# Patient Record
Sex: FEMALE | Race: WHITE | NOT HISPANIC OR LATINO | Employment: FULL TIME | URBAN - METROPOLITAN AREA
[De-identification: names, ages, dates, MRNs, and addresses within clinical notes are randomized per-mention and may not be internally consistent; named-entity substitution may affect disease eponyms.]

---

## 2018-06-18 ENCOUNTER — TRANSCRIBE ORDERS (OUTPATIENT)
Dept: ADMINISTRATIVE | Facility: HOSPITAL | Age: 33
End: 2018-06-18

## 2018-06-18 ENCOUNTER — HOSPITAL ENCOUNTER (OUTPATIENT)
Dept: CARDIOLOGY | Facility: HOSPITAL | Age: 33
Discharge: HOME | End: 2018-06-18
Attending: EMERGENCY MEDICINE

## 2018-06-18 ENCOUNTER — HOSPITAL ENCOUNTER (EMERGENCY)
Facility: HOSPITAL | Age: 33
Discharge: HOME | End: 2018-06-18
Attending: EMERGENCY MEDICINE | Admitting: EMERGENCY MEDICINE

## 2018-06-18 ENCOUNTER — APPOINTMENT (OUTPATIENT)
Dept: GENERAL RADIOLOGY | Facility: HOSPITAL | Age: 33
End: 2018-06-18

## 2018-06-18 ENCOUNTER — APPOINTMENT (OUTPATIENT)
Dept: CT IMAGING | Facility: HOSPITAL | Age: 33
End: 2018-06-18

## 2018-06-18 VITALS
HEIGHT: 61 IN | DIASTOLIC BLOOD PRESSURE: 78 MMHG | BODY MASS INDEX: 26.43 KG/M2 | SYSTOLIC BLOOD PRESSURE: 121 MMHG | WEIGHT: 140 LBS | TEMPERATURE: 98.1 F | RESPIRATION RATE: 16 BRPM | HEART RATE: 98 BPM | OXYGEN SATURATION: 99 %

## 2018-06-18 DIAGNOSIS — R55 NEAR SYNCOPE: Primary | ICD-10-CM

## 2018-06-18 DIAGNOSIS — R00.2 PALPITATIONS: ICD-10-CM

## 2018-06-18 DIAGNOSIS — R00.2 PALPITATION: Primary | ICD-10-CM

## 2018-06-18 DIAGNOSIS — R00.2 PALPITATION: ICD-10-CM

## 2018-06-18 LAB
ALBUMIN SERPL-MCNC: 4.4 G/DL (ref 3.5–5.2)
ALBUMIN/GLOB SERPL: 1.8 G/DL
ALP SERPL-CCNC: 52 U/L (ref 40–129)
ALT SERPL W P-5'-P-CCNC: 33 U/L (ref 5–33)
AMPHET+METHAMPHET UR QL: POSITIVE
AMPHETAMINES UR QL: NEGATIVE
ANION GAP SERPL CALCULATED.3IONS-SCNC: 11.5 MMOL/L
AST SERPL-CCNC: 26 U/L (ref 5–32)
B-HCG UR QL: NEGATIVE
BACTERIA UR QL AUTO: ABNORMAL /HPF
BARBITURATES UR QL SCN: POSITIVE
BASOPHILS # BLD AUTO: 0.03 10*3/MM3 (ref 0–0.2)
BASOPHILS NFR BLD AUTO: 0.4 % (ref 0–2)
BENZODIAZ UR QL SCN: POSITIVE
BILIRUB SERPL-MCNC: 0.2 MG/DL (ref 0.2–1.2)
BILIRUB UR QL STRIP: NEGATIVE
BUN BLD-MCNC: 11 MG/DL (ref 6–20)
BUN/CREAT SERPL: 17.7 (ref 7–25)
BUPRENORPHINE SERPL-MCNC: NEGATIVE NG/ML
CALCIUM SPEC-SCNC: 9.2 MG/DL (ref 8.6–10.5)
CANNABINOIDS SERPL QL: NEGATIVE
CHLORIDE SERPL-SCNC: 104 MMOL/L (ref 98–107)
CLARITY UR: CLEAR
CO2 SERPL-SCNC: 24.5 MMOL/L (ref 22–29)
COCAINE UR QL: NEGATIVE
COLOR UR: YELLOW
CREAT BLD-MCNC: 0.62 MG/DL (ref 0.57–1)
DEPRECATED RDW RBC AUTO: 42.7 FL (ref 37–54)
EOSINOPHIL # BLD AUTO: 0.28 10*3/MM3 (ref 0.1–0.3)
EOSINOPHIL NFR BLD AUTO: 3.6 % (ref 0–4)
ERYTHROCYTE [DISTWIDTH] IN BLOOD BY AUTOMATED COUNT: 12.3 % (ref 11.5–14.5)
GFR SERPL CREATININE-BSD FRML MDRD: 110 ML/MIN/1.73
GLOBULIN UR ELPH-MCNC: 2.4 GM/DL
GLUCOSE BLD-MCNC: 102 MG/DL (ref 65–99)
GLUCOSE UR STRIP-MCNC: NEGATIVE MG/DL
HCT VFR BLD AUTO: 40.5 % (ref 37–47)
HGB BLD-MCNC: 13.7 G/DL (ref 12–16)
HGB UR QL STRIP.AUTO: NEGATIVE
HYALINE CASTS UR QL AUTO: ABNORMAL /LPF
IMM GRANULOCYTES # BLD: 0.02 10*3/MM3 (ref 0–0.03)
IMM GRANULOCYTES NFR BLD: 0.3 % (ref 0–0.5)
KETONES UR QL STRIP: NEGATIVE
LEUKOCYTE ESTERASE UR QL STRIP.AUTO: NEGATIVE
LYMPHOCYTES # BLD AUTO: 2.66 10*3/MM3 (ref 0.6–4.8)
LYMPHOCYTES NFR BLD AUTO: 34 % (ref 20–45)
MCH RBC QN AUTO: 32 PG (ref 27–31)
MCHC RBC AUTO-ENTMCNC: 33.8 G/DL (ref 31–37)
MCV RBC AUTO: 94.6 FL (ref 81–99)
METHADONE UR QL SCN: NEGATIVE
MONOCYTES # BLD AUTO: 0.42 10*3/MM3 (ref 0–1)
MONOCYTES NFR BLD AUTO: 5.4 % (ref 3–8)
NEUTROPHILS # BLD AUTO: 4.41 10*3/MM3 (ref 1.5–8.3)
NEUTROPHILS NFR BLD AUTO: 56.3 % (ref 45–70)
NITRITE UR QL STRIP: NEGATIVE
NRBC BLD MANUAL-RTO: 0 /100 WBC (ref 0–0)
OPIATES UR QL: NEGATIVE
OXYCODONE UR QL SCN: NEGATIVE
PCP UR QL SCN: NEGATIVE
PH UR STRIP.AUTO: 8.5 [PH] (ref 4.5–8)
PLATELET # BLD AUTO: 202 10*3/MM3 (ref 140–500)
PMV BLD AUTO: 11.3 FL (ref 7.4–10.4)
POTASSIUM BLD-SCNC: 3.5 MMOL/L (ref 3.5–5.2)
PROPOXYPH UR QL: NEGATIVE
PROT SERPL-MCNC: 6.8 G/DL (ref 6–8.5)
PROT UR QL STRIP: ABNORMAL
RBC # BLD AUTO: 4.28 10*6/MM3 (ref 4.2–5.4)
RBC # UR: ABNORMAL /HPF
REF LAB TEST METHOD: ABNORMAL
SODIUM BLD-SCNC: 140 MMOL/L (ref 136–145)
SP GR UR STRIP: 1.01 (ref 1–1.03)
SQUAMOUS #/AREA URNS HPF: ABNORMAL /HPF
TRICYCLICS UR QL SCN: NEGATIVE
TROPONIN T SERPL-MCNC: <0.01 NG/ML (ref 0–0.03)
TSH SERPL DL<=0.05 MIU/L-ACNC: 1.44 MIU/ML (ref 0.27–4.2)
UROBILINOGEN UR QL STRIP: ABNORMAL
WBC NRBC COR # BLD: 7.82 10*3/MM3 (ref 4.8–10.8)
WBC UR QL AUTO: ABNORMAL /HPF

## 2018-06-18 PROCEDURE — 71045 X-RAY EXAM CHEST 1 VIEW: CPT

## 2018-06-18 PROCEDURE — 81001 URINALYSIS AUTO W/SCOPE: CPT | Performed by: EMERGENCY MEDICINE

## 2018-06-18 PROCEDURE — 81025 URINE PREGNANCY TEST: CPT | Performed by: EMERGENCY MEDICINE

## 2018-06-18 PROCEDURE — P9612 CATHETERIZE FOR URINE SPEC: HCPCS

## 2018-06-18 PROCEDURE — 93225 XTRNL ECG REC<48 HRS REC: CPT

## 2018-06-18 PROCEDURE — 80053 COMPREHEN METABOLIC PANEL: CPT | Performed by: EMERGENCY MEDICINE

## 2018-06-18 PROCEDURE — 99283 EMERGENCY DEPT VISIT LOW MDM: CPT

## 2018-06-18 PROCEDURE — 84484 ASSAY OF TROPONIN QUANT: CPT | Performed by: EMERGENCY MEDICINE

## 2018-06-18 PROCEDURE — 70450 CT HEAD/BRAIN W/O DYE: CPT

## 2018-06-18 PROCEDURE — 84443 ASSAY THYROID STIM HORMONE: CPT | Performed by: EMERGENCY MEDICINE

## 2018-06-18 PROCEDURE — 93005 ELECTROCARDIOGRAM TRACING: CPT | Performed by: EMERGENCY MEDICINE

## 2018-06-18 PROCEDURE — 85025 COMPLETE CBC W/AUTO DIFF WBC: CPT | Performed by: EMERGENCY MEDICINE

## 2018-06-18 PROCEDURE — 93010 ELECTROCARDIOGRAM REPORT: CPT | Performed by: INTERNAL MEDICINE

## 2018-06-18 PROCEDURE — 80306 DRUG TEST PRSMV INSTRMNT: CPT | Performed by: EMERGENCY MEDICINE

## 2018-06-18 PROCEDURE — 99284 EMERGENCY DEPT VISIT MOD MDM: CPT | Performed by: EMERGENCY MEDICINE

## 2018-06-18 RX ORDER — ALPRAZOLAM 2 MG/1
2 TABLET ORAL DAILY PRN
COMMUNITY
End: 2018-09-21

## 2018-06-18 NOTE — ED PROVIDER NOTES
Subjective   History of Present Illness  History of Present Illness    Chief complaint: Patient felt like she was going to pass out    Location: At [facility] working    Quality/Severity:  Near syncope    Timing/Onset/Duration: Acute onset    Modifying Factors: Time seems to make it better, nothing makes it worse.  Patient relates that her symptoms started when she started the methotrexate.    Associated Symptoms: She complains of a mild headache.  No fever chills or cough.  No sore throat earache or nasal congestion.  No chest pain.  She did have palpitations.  She states she fell at her heart was racing.  No abdominal pain.  No diarrhea or burning when she urinates.  No nausea or vomiting.    Narrative: As 33-year-old white female presents after having a near syncopal episode approximately 1 hour prior to arrival.  Patient has a history of cervical cancer.  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  The patient states that she felt like her heart was racing and that she was about ready to pass out.  The patient complains of a mild headache.  No fever chills or cough.  No sore throat earache or nasal congestion.  His pain or shortness of breath.  No abdominal pain.  No diarrhea or burning when she urinates.  No change in the size, color, or consistency of her stools.  The patient relates that she has had these symptoms on and off since she started her methotrexate.    PCP:  No Known Provider      Review of Systems   Constitutional: Negative for chills and fever.   HENT: Negative for ear pain and sore throat.    Eyes: Negative for discharge, redness and visual disturbance.   Respiratory: Negative for cough, chest tightness, shortness of breath and wheezing.    Cardiovascular: Positive for palpitations. Negative for chest pain and leg swelling.   Gastrointestinal: Negative for abdominal pain, blood in stool, constipation, diarrhea, nausea and vomiting.   Endocrine: Negative for polyuria.    Genitourinary: Negative for difficulty urinating.   Musculoskeletal: Negative for back pain.   Skin: Negative for pallor and rash.   Neurological: Positive for light-headedness. Negative for dizziness, speech difficulty, weakness, numbness and headaches.   Hematological: Negative for adenopathy.   Psychiatric/Behavioral: Negative.  Negative for confusion.        Medication List      ASK your doctor about these medications    ALPRAZolam 2 MG tablet  Commonly known as:  XANAX     methotrexate 2.5 MG tablet     VYVANSE 70 MG capsule  Generic drug:  lisdexamfetamine          Past Medical History:   Diagnosis Date   • Cancer    • Cervical cancer        Allergies   Allergen Reactions   • Penicillins Hives       Past Surgical History:   Procedure Laterality Date   • CERVIX REMOVAL     •  SECTION         History reviewed. No pertinent family history.    Social History     Social History   • Marital status:      Social History Main Topics   • Smoking status: Never Smoker   • Alcohol use No   • Drug use: No     Other Topics Concern   • Not on file           Objective   Physical Exam   Constitutional: She is oriented to person, place, and time. She appears well-developed and well-nourished. No distress.   ED Triage Vitals (18 1806)  Temp: 98.1 °F (36.7 °C)  Heart Rate: 81  Resp: 16  BP: 112/76  SpO2: 100 %  Temp src: Oral  Heart Rate Source: Monitor  Patient Position: n/a  BP Location: n/a  FiO2 (%): n/a    The patient's vitals were reviewed by me.  Unless otherwise noted they are within normal limits.     HENT:   Head: Normocephalic and atraumatic.   Right Ear: External ear normal.   Left Ear: External ear normal.   Nose: Nose normal.   Mouth/Throat: Oropharynx is clear and moist.   Eyes: Conjunctivae and EOM are normal. Pupils are equal, round, and reactive to light. Right eye exhibits no discharge. Left eye exhibits no discharge. No scleral icterus.   Neck: Normal range of motion. Neck supple. No JVD  present. No tracheal deviation present. No thyromegaly present.   Cardiovascular: Normal rate, regular rhythm, normal heart sounds and intact distal pulses.  Exam reveals no gallop and no friction rub.    No murmur heard.  Pulmonary/Chest: Effort normal and breath sounds normal. No stridor. No respiratory distress. She has no wheezes. She has no rales. She exhibits no tenderness.   Abdominal: Soft. Bowel sounds are normal. She exhibits no distension and no mass. There is no tenderness. There is no rebound and no guarding. No hernia.   Musculoskeletal: Normal range of motion. She exhibits no edema or deformity.   Lymphadenopathy:     She has no cervical adenopathy.   Neurological: She is alert and oriented to person, place, and time. No cranial nerve deficit or sensory deficit. She exhibits normal muscle tone. Coordination normal.   Skin: Skin is warm and dry. No rash noted. She is not diaphoretic. No erythema. No pallor.   Psychiatric: Her behavior is normal.   Nursing note and vitals reviewed.      Procedures           ED Course  ED Course as of Jun 18 1947   Mon Jun 18, 2018 1945 The laboratory values were reviewed by me.  The urine drug screen is positive for amphetamines, benzodiazepines, and barbiturates.  The urine microscopic shows 0-2 RBCs.  The serum glucose is 102.  The laboratory values are otherwise unremarkable.  [RC]      ED Course User Index  [RC] Sergey Cuello MD      6:42 PM, 06/18/18:  The EKG was obtained at 1837.  EKG was read by me at 1838.  EKG shows a normal sinus rhythm with rate of 76.  There is a normal axis with no hypertrophy.  The CT, QRS, QT intervals are unremarkable.  There is no ectopy.  There is no acute ST elevation or depression.  7:17 PM, 06/18/18:  The chest x-ray was contemporaneously reviewed by me.  There is no active disease.  7:18 PM, 06/18/18:  The orthostatics were negative.      7:44 PM, 06/18/18:  Patient also takes BuPap for her migraine headaches which will give  her a positive barbiturate screen on the urine drug screen.  The patient is on binasal which is an amphetamine and xanax which will cause her to screen positive for benzodiazepines.    7:46 PM, 06/18/18:  Patient was reassessed.  She has no new complaints.  Her vital signs were reviewed and are stable.  No further episodes of heart racing.    7:46 PM, 06/18/18:  Patient's diagnosis of near-syncope was discussed with her.  She should rest.  She should drink lots of fluids.  We will obtain a Holter monitor and have the patient follow with Dr. Hurtado.  She should decrease her caffeine.  All the patient's questions were achieved she will be discharged in good condition.    8:29 PM, 06/18/18:  CT of the head as read by Dr. Haque negative.    MDM    No orders to display     Labs Reviewed - No data to display  No results found.    Final diagnoses:   None         ED Medications:  Medications - No data to display    New Medications:     Medication List      ASK your doctor about these medications    ALPRAZolam 2 MG tablet  Commonly known as:  XANAX     methotrexate 2.5 MG tablet     VYVANSE 70 MG capsule  Generic drug:  lisdexamfetamine          Stopped Medications:     Medication List      ASK your doctor about these medications    ALPRAZolam 2 MG tablet  Commonly known as:  XANAX     methotrexate 2.5 MG tablet     VYVANSE 70 MG capsule  Generic drug:  lisdexamfetamine            Final diagnoses:   Near syncope   Palpitations            Sergey Cuello MD  06/18/18 8497

## 2018-06-18 NOTE — DISCHARGE INSTRUCTIONS
Rest.  Drink lots of fluids.  Outpatient Holter monitor with the results to Dr. Hurtado.  Follow-up with Dr. Hurtado after the Holter monitor.  Return to emergency department if there is increasing episodes of fast heart rate, passing out or near passing out, worse in any way at all.  Decrease your caffeine intake.

## 2019-03-07 ENCOUNTER — HOSPITAL ENCOUNTER (OUTPATIENT)
Dept: GENERAL RADIOLOGY | Facility: HOSPITAL | Age: 34
Discharge: HOME | End: 2019-03-07

## 2019-03-07 ENCOUNTER — TRANSCRIBE ORDERS (OUTPATIENT)
Dept: ADMINISTRATIVE | Facility: HOSPITAL | Age: 34
End: 2019-03-07

## 2019-03-07 DIAGNOSIS — M79.672 LEFT FOOT PAIN: Primary | ICD-10-CM

## 2019-03-07 DIAGNOSIS — M79.672 LEFT FOOT PAIN: ICD-10-CM

## 2019-03-07 PROCEDURE — 73630 X-RAY EXAM OF FOOT: CPT

## 2019-04-04 DIAGNOSIS — S93.622D: Primary | ICD-10-CM

## 2019-04-16 DIAGNOSIS — S93.622D: Primary | ICD-10-CM

## 2019-09-03 ENCOUNTER — HOSPITAL ENCOUNTER (EMERGENCY)
Facility: HOSPITAL | Age: 34
Discharge: HOME | End: 2019-09-03
Attending: EMERGENCY MEDICINE | Admitting: EMERGENCY MEDICINE

## 2019-09-03 ENCOUNTER — APPOINTMENT (OUTPATIENT)
Dept: CT IMAGING | Facility: HOSPITAL | Age: 34
End: 2019-09-03

## 2019-09-03 VITALS
RESPIRATION RATE: 16 BRPM | TEMPERATURE: 98.1 F | DIASTOLIC BLOOD PRESSURE: 70 MMHG | SYSTOLIC BLOOD PRESSURE: 109 MMHG | HEIGHT: 61 IN | BODY MASS INDEX: 31.42 KG/M2 | HEART RATE: 62 BPM | WEIGHT: 166.4 LBS | OXYGEN SATURATION: 97 %

## 2019-09-03 DIAGNOSIS — G43.109 MIGRAINE WITH AURA AND WITHOUT STATUS MIGRAINOSUS, NOT INTRACTABLE: Primary | ICD-10-CM

## 2019-09-03 LAB
ALBUMIN SERPL-MCNC: 4.8 G/DL (ref 3.5–5.2)
ALBUMIN/GLOB SERPL: 1.7 G/DL
ALP SERPL-CCNC: 60 U/L (ref 39–117)
ALT SERPL W P-5'-P-CCNC: 102 U/L (ref 1–33)
ANION GAP SERPL CALCULATED.3IONS-SCNC: 12.1 MMOL/L (ref 5–15)
AST SERPL-CCNC: 31 U/L (ref 1–32)
B-HCG UR QL: NEGATIVE
BACTERIA UR QL AUTO: ABNORMAL /HPF
BASOPHILS # BLD AUTO: 0.04 10*3/MM3 (ref 0–0.2)
BASOPHILS NFR BLD AUTO: 0.5 % (ref 0–1.5)
BILIRUB SERPL-MCNC: 0.6 MG/DL (ref 0.2–1.2)
BILIRUB UR QL STRIP: NEGATIVE
BUN BLD-MCNC: 10 MG/DL (ref 6–20)
BUN/CREAT SERPL: 13.9 (ref 7–25)
CALCIUM SPEC-SCNC: 9.4 MG/DL (ref 8.6–10.5)
CHLORIDE SERPL-SCNC: 108 MMOL/L (ref 98–107)
CLARITY UR: CLEAR
CO2 SERPL-SCNC: 24.9 MMOL/L (ref 22–29)
COLOR UR: YELLOW
CREAT BLD-MCNC: 0.72 MG/DL (ref 0.57–1)
DEPRECATED RDW RBC AUTO: 47.8 FL (ref 37–54)
EOSINOPHIL # BLD AUTO: 0.18 10*3/MM3 (ref 0–0.4)
EOSINOPHIL NFR BLD AUTO: 2.2 % (ref 0.3–6.2)
ERYTHROCYTE [DISTWIDTH] IN BLOOD BY AUTOMATED COUNT: 13.3 % (ref 12.3–15.4)
GFR SERPL CREATININE-BSD FRML MDRD: 92 ML/MIN/1.73
GLOBULIN UR ELPH-MCNC: 2.8 GM/DL
GLUCOSE BLD-MCNC: 116 MG/DL (ref 65–99)
GLUCOSE UR STRIP-MCNC: NEGATIVE MG/DL
HCT VFR BLD AUTO: 41 % (ref 34–46.6)
HGB BLD-MCNC: 13.1 G/DL (ref 12–15.9)
HGB UR QL STRIP.AUTO: ABNORMAL
HYALINE CASTS UR QL AUTO: ABNORMAL /LPF
IMM GRANULOCYTES # BLD AUTO: 0.03 10*3/MM3 (ref 0–0.05)
IMM GRANULOCYTES NFR BLD AUTO: 0.4 % (ref 0–0.5)
KETONES UR QL STRIP: ABNORMAL
LEUKOCYTE ESTERASE UR QL STRIP.AUTO: NEGATIVE
LYMPHOCYTES # BLD AUTO: 1.95 10*3/MM3 (ref 0.7–3.1)
LYMPHOCYTES NFR BLD AUTO: 24.3 % (ref 19.6–45.3)
MCH RBC QN AUTO: 30.9 PG (ref 26.6–33)
MCHC RBC AUTO-ENTMCNC: 32 G/DL (ref 31.5–35.7)
MCV RBC AUTO: 96.7 FL (ref 79–97)
MONOCYTES # BLD AUTO: 0.45 10*3/MM3 (ref 0.1–0.9)
MONOCYTES NFR BLD AUTO: 5.6 % (ref 5–12)
MUCOUS THREADS URNS QL MICRO: ABNORMAL /HPF
NEUTROPHILS # BLD AUTO: 5.38 10*3/MM3 (ref 1.7–7)
NEUTROPHILS NFR BLD AUTO: 67 % (ref 42.7–76)
NITRITE UR QL STRIP: NEGATIVE
NRBC BLD AUTO-RTO: 0 /100 WBC (ref 0–0.2)
PH UR STRIP.AUTO: 5.5 [PH] (ref 5–8)
PLATELET # BLD AUTO: 216 10*3/MM3 (ref 140–450)
PMV BLD AUTO: 11.4 FL (ref 6–12)
POTASSIUM BLD-SCNC: 4 MMOL/L (ref 3.5–5.2)
PROT SERPL-MCNC: 7.6 G/DL (ref 6–8.5)
PROT UR QL STRIP: NEGATIVE
RBC # BLD AUTO: 4.24 10*6/MM3 (ref 3.77–5.28)
RBC # UR: ABNORMAL /HPF
REF LAB TEST METHOD: ABNORMAL
SODIUM BLD-SCNC: 145 MMOL/L (ref 136–145)
SP GR UR STRIP: 1.02 (ref 1–1.03)
SQUAMOUS #/AREA URNS HPF: ABNORMAL /HPF
UROBILINOGEN UR QL STRIP: ABNORMAL
WBC NRBC COR # BLD: 8.03 10*3/MM3 (ref 3.4–10.8)
WBC UR QL AUTO: ABNORMAL /HPF

## 2019-09-03 PROCEDURE — 85025 COMPLETE CBC W/AUTO DIFF WBC: CPT | Performed by: NURSE PRACTITIONER

## 2019-09-03 PROCEDURE — 25010000002 DEXAMETHASONE SODIUM PHOSPHATE 20 MG/5ML SOLUTION: Performed by: NURSE PRACTITIONER

## 2019-09-03 PROCEDURE — 81025 URINE PREGNANCY TEST: CPT | Performed by: NURSE PRACTITIONER

## 2019-09-03 PROCEDURE — 96374 THER/PROPH/DIAG INJ IV PUSH: CPT

## 2019-09-03 PROCEDURE — 99283 EMERGENCY DEPT VISIT LOW MDM: CPT

## 2019-09-03 PROCEDURE — 81001 URINALYSIS AUTO W/SCOPE: CPT | Performed by: NURSE PRACTITIONER

## 2019-09-03 PROCEDURE — 25010000002 DIPHENHYDRAMINE PER 50 MG: Performed by: NURSE PRACTITIONER

## 2019-09-03 PROCEDURE — 25010000002 KETOROLAC TROMETHAMINE PER 15 MG: Performed by: NURSE PRACTITIONER

## 2019-09-03 PROCEDURE — 25010000002 PROCHLORPERAZINE EDISYLATE PER 10 MG: Performed by: NURSE PRACTITIONER

## 2019-09-03 PROCEDURE — 96375 TX/PRO/DX INJ NEW DRUG ADDON: CPT

## 2019-09-03 PROCEDURE — 70450 CT HEAD/BRAIN W/O DYE: CPT

## 2019-09-03 PROCEDURE — 80053 COMPREHEN METABOLIC PANEL: CPT | Performed by: NURSE PRACTITIONER

## 2019-09-03 RX ORDER — KETOROLAC TROMETHAMINE 15 MG/ML
15 INJECTION, SOLUTION INTRAMUSCULAR; INTRAVENOUS ONCE
Status: COMPLETED | OUTPATIENT
Start: 2019-09-03 | End: 2019-09-03

## 2019-09-03 RX ORDER — DIPHENHYDRAMINE HYDROCHLORIDE 50 MG/ML
25 INJECTION INTRAMUSCULAR; INTRAVENOUS ONCE
Status: COMPLETED | OUTPATIENT
Start: 2019-09-03 | End: 2019-09-03

## 2019-09-03 RX ORDER — DEXAMETHASONE SODIUM PHOSPHATE 4 MG/ML
8 INJECTION, SOLUTION INTRA-ARTICULAR; INTRALESIONAL; INTRAMUSCULAR; INTRAVENOUS; SOFT TISSUE ONCE
Status: COMPLETED | OUTPATIENT
Start: 2019-09-03 | End: 2019-09-03

## 2019-09-03 RX ORDER — SODIUM CHLORIDE 0.9 % (FLUSH) 0.9 %
10 SYRINGE (ML) INJECTION AS NEEDED
Status: DISCONTINUED | OUTPATIENT
Start: 2019-09-03 | End: 2019-09-03 | Stop reason: HOSPADM

## 2019-09-03 RX ORDER — PROCHLORPERAZINE EDISYLATE 5 MG/ML
5 INJECTION INTRAMUSCULAR; INTRAVENOUS ONCE
Status: COMPLETED | OUTPATIENT
Start: 2019-09-03 | End: 2019-09-03

## 2019-09-03 RX ADMIN — KETOROLAC TROMETHAMINE 15 MG: 15 INJECTION, SOLUTION INTRAMUSCULAR; INTRAVENOUS at 16:42

## 2019-09-03 RX ADMIN — DIPHENHYDRAMINE HYDROCHLORIDE 25 MG: 50 INJECTION, SOLUTION INTRAMUSCULAR; INTRAVENOUS at 16:39

## 2019-09-03 RX ADMIN — SODIUM CHLORIDE 1000 ML: 9 INJECTION, SOLUTION INTRAVENOUS at 16:14

## 2019-09-03 RX ADMIN — DEXAMETHASONE SODIUM PHOSPHATE 8 MG: 4 INJECTION, SOLUTION INTRAMUSCULAR; INTRAVENOUS at 16:44

## 2019-09-03 RX ADMIN — PROCHLORPERAZINE EDISYLATE 5 MG: 5 INJECTION INTRAMUSCULAR; INTRAVENOUS at 16:46

## 2019-09-03 NOTE — ED NOTES
Pt reports she has had a migraine for 24 hours. Pt reports her home migraine medication are not helping.     Tyra Hansen, RN  09/03/19 0412

## 2019-09-03 NOTE — ED PROVIDER NOTES
" EMERGENCY DEPARTMENT ENCOUNTER    Room Number:    Date of encounter:  9/3/2019  PCP: Yesi Ann MD  Historian: Patient, family member      HPI:  Chief Complaint: HA  A complete HPI/ROS/PMH/PSH/SH/FH are unobtainable due to: nothing    Context: Tyra Carpenter is a 34 y.o. female with lengthy history of migraine headaches treated by her PCP who presents to the ED c/o worsening HA frequency that started 2 months ago. She notes that she is used to having HA's but the past 2 moths they have progressed. She states that the sx have greatly increased causing nosebleeds and sometimes \"blackouts\". She notes that she gets HA quite frequently with migraines being 3x a week but she keeps a daily headache that is manageable. She reports that she has also been on Fioricet as prescribed and has been taking Topamax PRN. Her mother has tried to tell her that Topamax is a daily drug but she has not followed this advice.   She notes that she has been using heating pads, cool packs, and taking Ibuprofen PRN with minimal relief.  She states she keeps a log of her headaches.  I asked her why she has not seen a specialist and she states \"I just have not wanted to deal with it\".      MEDICAL RECORDS REVIEWED  Reviewed pt's most recent UC visits from 19 and 19 for HA both times she received IM Toradol and was discharged.         PAST MEDICAL HISTORY  Active Ambulatory Problems     Diagnosis Date Noted   • No Active Ambulatory Problems     Resolved Ambulatory Problems     Diagnosis Date Noted   • No Resolved Ambulatory Problems     Past Medical History:   Diagnosis Date   • Cancer (CMS/HCC)    • Cervical cancer (CMS/HCC)    • Migraine          PAST SURGICAL HISTORY  Past Surgical History:   Procedure Laterality Date   • CERVIX REMOVAL     •  SECTION           FAMILY HISTORY  Family History   Problem Relation Age of Onset   • No Known Problems Mother    • No Known Problems Father          SOCIAL HISTORY  Social " History     Socioeconomic History   • Marital status:      Spouse name: Not on file   • Number of children: Not on file   • Years of education: Not on file   • Highest education level: Not on file   Tobacco Use   • Smoking status: Never Smoker   • Smokeless tobacco: Never Used   Substance and Sexual Activity   • Alcohol use: No   • Drug use: No   • Sexual activity: Defer         ALLERGIES  Latex; Penicillins; and Zofran [ondansetron hcl]        REVIEW OF SYSTEMS  Review of Systems   Constitutional: Negative for activity change, appetite change, fatigue and fever.   HENT: Positive for nosebleeds. Negative for congestion, dental problem, ear pain, facial swelling, rhinorrhea, sinus pressure and sinus pain.    Eyes: Positive for photophobia.   Respiratory: Negative for chest tightness.    Gastrointestinal: Positive for nausea. Negative for abdominal pain and vomiting.   Musculoskeletal: Negative for neck pain and neck stiffness.   Neurological: Positive for headaches. Negative for syncope, speech difficulty, light-headedness and numbness.        All systems reviewed and negative except for those discussed in HPI.       PHYSICAL EXAM    I have reviewed the triage vital signs and nursing notes.    ED Triage Vitals [09/03/19 1152]   Temp Heart Rate Resp BP SpO2   98.1 °F (36.7 °C) 74 18 -- 98 %      Temp src Heart Rate Source Patient Position BP Location FiO2 (%)   Tympanic -- -- -- --       Physical Exam   GENERAL: NAD, speech is pressured  HENT: nares patent, right nare has what appears to be a nasal polyp inside, no evidence of recent nosebleed.  Posterior oropharynx without erythema or evidence of recent epistaxis  EYES: no scleral icterus, PERRL, pupils appear enlarged but briskly reactive and EOM normal  CV: regular rhythm, regular rate  RESPIRATORY: normal effort  ABDOMEN: soft  MUSCULOSKELETAL: no deformity, neck supple, negative kernig and negative brudinski sign  NEURO: alert, moves all extremities,  follows commands, no focal neurological deficits  SKIN: warm, dry        LAB RESULTS  Recent Results (from the past 24 hour(s))   Comprehensive Metabolic Panel    Collection Time: 09/03/19  4:13 PM   Result Value Ref Range    Glucose 116 (H) 65 - 99 mg/dL    BUN 10 6 - 20 mg/dL    Creatinine 0.72 0.57 - 1.00 mg/dL    Sodium 145 136 - 145 mmol/L    Potassium 4.0 3.5 - 5.2 mmol/L    Chloride 108 (H) 98 - 107 mmol/L    CO2 24.9 22.0 - 29.0 mmol/L    Calcium 9.4 8.6 - 10.5 mg/dL    Total Protein 7.6 6.0 - 8.5 g/dL    Albumin 4.80 3.50 - 5.20 g/dL    ALT (SGPT) 102 (H) 1 - 33 U/L    AST (SGOT) 31 1 - 32 U/L    Alkaline Phosphatase 60 39 - 117 U/L    Total Bilirubin 0.6 0.2 - 1.2 mg/dL    eGFR Non African Amer 92 >60 mL/min/1.73    Globulin 2.8 gm/dL    A/G Ratio 1.7 g/dL    BUN/Creatinine Ratio 13.9 7.0 - 25.0    Anion Gap 12.1 5.0 - 15.0 mmol/L   CBC Auto Differential    Collection Time: 09/03/19  4:13 PM   Result Value Ref Range    WBC 8.03 3.40 - 10.80 10*3/mm3    RBC 4.24 3.77 - 5.28 10*6/mm3    Hemoglobin 13.1 12.0 - 15.9 g/dL    Hematocrit 41.0 34.0 - 46.6 %    MCV 96.7 79.0 - 97.0 fL    MCH 30.9 26.6 - 33.0 pg    MCHC 32.0 31.5 - 35.7 g/dL    RDW 13.3 12.3 - 15.4 %    RDW-SD 47.8 37.0 - 54.0 fl    MPV 11.4 6.0 - 12.0 fL    Platelets 216 140 - 450 10*3/mm3    Neutrophil % 67.0 42.7 - 76.0 %    Lymphocyte % 24.3 19.6 - 45.3 %    Monocyte % 5.6 5.0 - 12.0 %    Eosinophil % 2.2 0.3 - 6.2 %    Basophil % 0.5 0.0 - 1.5 %    Immature Grans % 0.4 0.0 - 0.5 %    Neutrophils, Absolute 5.38 1.70 - 7.00 10*3/mm3    Lymphocytes, Absolute 1.95 0.70 - 3.10 10*3/mm3    Monocytes, Absolute 0.45 0.10 - 0.90 10*3/mm3    Eosinophils, Absolute 0.18 0.00 - 0.40 10*3/mm3    Basophils, Absolute 0.04 0.00 - 0.20 10*3/mm3    Immature Grans, Absolute 0.03 0.00 - 0.05 10*3/mm3    nRBC 0.0 0.0 - 0.2 /100 WBC   Pregnancy, Urine - Urine, Clean Catch    Collection Time: 09/03/19  6:36 PM   Result Value Ref Range    HCG, Urine QL Negative  Negative   Urinalysis With Culture If Indicated - Urine, Clean Catch    Collection Time: 09/03/19  6:36 PM   Result Value Ref Range    Color, UA Yellow Yellow, Straw    Appearance, UA Clear Clear    pH, UA 5.5 5.0 - 8.0    Specific Gravity, UA 1.022 1.005 - 1.030    Glucose, UA Negative Negative    Ketones, UA 15 mg/dL (1+) (A) Negative    Bilirubin, UA Negative Negative    Blood, UA Small (1+) (A) Negative    Protein, UA Negative Negative    Leuk Esterase, UA Negative Negative    Nitrite, UA Negative Negative    Urobilinogen, UA 1.0 E.U./dL 0.2 - 1.0 E.U./dL   Urinalysis, Microscopic Only - Urine, Clean Catch    Collection Time: 09/03/19  6:36 PM   Result Value Ref Range    RBC, UA None Seen None Seen, 0-2 /HPF    WBC, UA None Seen None Seen, 0-2 /HPF    Bacteria, UA None Seen None Seen /HPF    Squamous Epithelial Cells, UA 7-12 (A) None Seen, 0-2 /HPF    Hyaline Casts, UA None Seen None Seen /LPF    Mucus, UA Small/1+ (A) None Seen, Trace /HPF    Methodology Manual Light Microscopy        Ordered the above labs and independently reviewed the results.        RADIOLOGY  Ct Head Without Contrast    Result Date: 9/3/2019  CT HEAD WITHOUT CONTRAST  HISTORY: Increasing headache.  TECHNIQUE:  Head CT includes axial imaging from the base of skull to the vertex without intravenous contrast. Radiation dose reduction techniques were utilized, including automated exposure control and exposure modulation based on body size.  COMPARISON:  None.  FINDINGS: There are no abnormal areas of increased attenuation intra-axially to suggest hemorrhage. No extra-axial fluid collection is observed. There is no evidence for cerebral edema or mass effect or shift of the midline structures. No hemorrhage is demonstrated within the interpeduncular fossa or basilar cisterns or within the ventricles. Bone windows demonstrate no osseous abnormality. Mastoid air cells and visualized paranasal sinuses appear clear.      Negative head CT.   Discussed with JEOVANNY Chau, in the emergency department on 09/03/2019 at 7:17 PM.    This report was finalized on 9/3/2019 7:40 PM by Dr. Walker Steven M.D.         CT Head: Negative Acute    I ordered the above noted radiological studies. Reviewed by me and discussed with radiologist (Dr. Schultz).  See dictation for official radiology interpretation.      PROCEDURES  Procedures      MEDICATIONS GIVEN IN ER  Medications   sodium chloride 0.9 % flush 10 mL (not administered)   sodium chloride 0.9 % bolus 1,000 mL (0 mL Intravenous Stopped 9/3/19 1933)   prochlorperazine (COMPAZINE) injection 5 mg (5 mg Intravenous Given 9/3/19 1646)   diphenhydrAMINE (BENADRYL) injection 25 mg (25 mg Intravenous Given 9/3/19 1639)   ketorolac (TORADOL) injection 15 mg (15 mg Intravenous Given 9/3/19 1642)   dexamethasone sodium phosphate injection 8 mg (8 mg Intravenous Given 9/3/19 1644)         PROGRESS, DATA ANALYSIS, CONSULTS, AND MEDICAL DECISION MAKING    All labs have been independently reviewed by me.  All radiology studies have been reviewed by me and discussed with radiologist dictating the report.   EKG's independently viewed and interpreted by me.  Discussion below represents my analysis of pertinent findings related to patient's condition, differential diagnosis, treatment plan and final disposition.    1558- Initial encounter. The patient is resting comfortably and in NAD. Discussed the plan for DC pending the workup results. D/w pt the need to keep a HA log and dietary log upon DC. Instructions were given to f/u with neurology and ENT. Pt understands and agrees with the plan, all questions answered.    1746- Rechecked pt who is resting comfortably in NAD. Pt states that the HA is gone. D/w pt the plan to for a CT head. Pt understands and agrees with plan. All questions answered.      1929- Rechecked pt who is resting comfortably in NAD. Informed pt of the CT scan results and the plan to DC. Pt understands and  agrees with plan. All questions answered.      1930- Reviewed pt's history and workup with Dr. Angel.  After a bedside evaluation; Dr. Angel agrees with the plan of care.        Final Diagnosis: as of Sep 03 1946   Migraine with aura and without status migrainosus, not intractable       AS OF 7:46 PM VITALS:    BP - 109/70  HR - 62  TEMP - 98.1 °F (36.7 °C) (Tympanic)  02 SATS - 97%        DIAGNOSIS  Final diagnoses:   Migraine with aura and without status migrainosus, not intractable         DISPOSITION  DISCHARGE    Patient discharged in stable condition.    Reviewed implications of results, diagnosis, meds, responsibility to follow up, warning signs and symptoms of possible worsening, potential complications and reasons to return to ER.    Patient/Family voiced understanding of above instructions.    Discussed plan for discharge, as there is no emergent indication for admission. Patient referred to primary care provider for BP management due to today's BP. Pt/family is agreeable and understands need for follow up and repeat testing.  Pt is aware that discharge does not mean that nothing is wrong but it indicates no emergency is present that requires admission and they must continue care with follow-up as given below or physician of their choice.     FOLLOW-UP  Rufina Alarcon MD  [address] [place] [address]  [phone]    Schedule an appointment as soon as possible for a visit   to discuss medical management of your migraine headaches         Medication List      No changes were made to your prescriptions during this visit.         --  Documentation assistance provided by jarrell Stone for JEOVANNY Chau.  Information recorded by the jarrell was done at my direction and has been verified and validated by me.     Janet Stone  09/03/19 1938       Susana Gama APRN  09/03/19 1947

## 2019-09-03 NOTE — ED NOTES
Pt asleep at this time. Appears in NAD at this time, breathing even and unlabored. Pt mother and pt son at bedside.      Rosie Pereira RN  09/03/19 9834

## 2019-11-07 ENCOUNTER — OFFICE VISIT (OUTPATIENT)
Dept: NEUROLOGY | Facility: CLINIC | Age: 34
End: 2019-11-07

## 2019-11-07 VITALS
HEIGHT: 62 IN | DIASTOLIC BLOOD PRESSURE: 80 MMHG | SYSTOLIC BLOOD PRESSURE: 128 MMHG | WEIGHT: 165.2 LBS | BODY MASS INDEX: 30.4 KG/M2

## 2019-11-07 DIAGNOSIS — G43.109 MIGRAINE WITH AURA AND WITHOUT STATUS MIGRAINOSUS, NOT INTRACTABLE: Primary | ICD-10-CM

## 2019-11-07 PROCEDURE — 99244 OFF/OP CNSLTJ NEW/EST MOD 40: CPT | Performed by: PSYCHIATRY & NEUROLOGY

## 2019-11-07 RX ORDER — SUMATRIPTAN 50 MG/1
50 TABLET, FILM COATED ORAL ONCE
Qty: 30 TABLET | Refills: 2 | Status: SHIPPED | OUTPATIENT
Start: 2019-11-07 | End: 2019-11-07

## 2019-11-07 RX ORDER — TOPIRAMATE 50 MG/1
50 TABLET, FILM COATED ORAL NIGHTLY
Qty: 30 TABLET | Refills: 11 | Status: SHIPPED | OUTPATIENT
Start: 2019-11-07 | End: 2021-03-18

## 2019-11-07 NOTE — PROGRESS NOTES
Subjective:     Patient ID: Tyra Carpenter is a 34 y.o. female.    History of Present Illness    The patient is a 34-year-old right-handed woman who was seen for further evaluation of migraines.  The patient was seen today in consultation per the request of Dr. Ann.  History was also taken from the patient's stepmother.  The patient has had headaches since her teens.  She has an aura of either neck pain or eye pain.  His headache is either bifrontal or posterior.  Can last from a few hours up to all day.  She has GI upset and vomiting with it.  She is light noise sensitive.  She will go to bed if she can.  She has had 2 normal CAT scans of the head without contrast in June 2018 in September 2019.  Was tried on butalbital in the past.  She is currently on topiramate 50 mg a day but takes this quite sporadically.  She is never taken it regularly.  She has been on this for 2 to 3 months.  Headaches are intermittent and severe.  The following portions of the patient's history were reviewed and updated as appropriate: allergies, current medications, past family history, past medical history, past social history, past surgical history and problem list.      Family History   Problem Relation Age of Onset   • No Known Problems Mother    • Neuropathy Father    • Seizures Father        Past Medical History:   Diagnosis Date   • Cancer (CMS/[facility])    • Cervical cancer (CMS/[facility])    • Cluster headache    • Headache, tension-type    • Migraine    • Vision loss        Social History     Socioeconomic History   • Marital status:      Spouse name: Not on file   • Number of children: Not on file   • Years of education: Not on file   • Highest education level: Not on file   Tobacco Use   • Smoking status: Never Smoker   • Smokeless tobacco: Never Used   Substance and Sexual Activity   • Alcohol use: No   • Drug use: No   • Sexual activity: Defer         Current Outpatient Medications:   •  levonorgestrel (MIRENA, 52 MG,) 20  MCG/24HR IUD, Mirena 20 mcg/24 hr (5 years) intrauterine device  Take 1 device every day by intrauterine route for 1 day., Disp: , Rfl:   •  topiramate (TOPAMAX) 50 MG tablet, Take 1 tablet by mouth Every Night., Disp: 30 tablet, Rfl: 11  •  VYVANSE 70 MG capsule, Take 70 mg by mouth Daily, Disp: , Rfl: 0  •  SUMAtriptan (IMITREX) 50 MG tablet, Take 1 tablet by mouth 1 (One) Time for 1 dose. Take one tablet at onset of headache., Disp: 30 tablet, Rfl: 2    Review of Systems   Constitutional: Positive for fatigue. Negative for activity change and appetite change.   HENT: Positive for nosebleeds. Negative for ear pain, facial swelling and hearing loss.    Eyes: Positive for photophobia and visual disturbance. Negative for pain, redness and itching.   Respiratory: Negative for cough, choking and shortness of breath.    Cardiovascular: Negative for chest pain, palpitations and leg swelling.   Gastrointestinal: Negative for abdominal pain, nausea and vomiting.   Endocrine: Positive for polydipsia.   Musculoskeletal: Positive for neck pain. Negative for arthralgias, back pain and joint swelling.   Skin: Negative for color change, pallor and rash.   Neurological: Positive for numbness and headaches. Negative for dizziness, tremors, seizures, syncope, facial asymmetry, speech difficulty, weakness and light-headedness.   Psychiatric/Behavioral: Positive for sleep disturbance. Negative for agitation, behavioral problems, decreased concentration, dysphoric mood, hallucinations, self-injury and suicidal ideas. The patient is not nervous/anxious and is not hyperactive.         I have reviewed ROS completed by medical assistant.     Objective:    Neurologic Exam  General appearance is normal. Mental status showed normal recent and remote memory, attention span and concentration, language, and fund of knowledge for age.  Oriented to time place and person.    Cranial nerve exam- visual fields to OKN tape, funduscopy with  examination of the optic disc and posterior segments of the eye, eye movements, pupillary reflexes, muscles of mastication, facial musculature, hearing, palatal movement, shoulder shrug and tongue protrusion were all normal.    Sensory examination was normal.  Deep tendon reflexes were 2+ and symmetric in all 4 extremities.  No pathologic reflexes were noted.  Cerebellar function was normal.  No focal weakness was noted in arms and legs.  No drift of outstretched arms.  Tone was normal in all 4 extremities.  Gait and station were normal.  No edema was noted.      Physical Exam    Assessment/Plan:     Tyra was seen today for migraine.    Diagnoses and all orders for this visit:    Migraine with aura and without status migrainosus, not intractable    Other orders  -     topiramate (TOPAMAX) 50 MG tablet; Take 1 tablet by mouth Every Night.  -     SUMAtriptan (IMITREX) 50 MG tablet; Take 1 tablet by mouth 1 (One) Time for 1 dose. Take one tablet at onset of headache.       The headaches sound predominantly migraine.  She is getting into a chronic pattern.  Neurologic examination is normal she has had 2 normal CAT scans of the head.  She is a candidate for topiramate and she has not been taking this appropriately.  She will take 50 mg every night.  I also added Imitrex 50 mg on a as needed basis and suggested that she take this very early in the attack.  Reviewed lifestyle issues.  Neurologic examination is normal.  No further test at the present time.  Phone follow-up in 6 weeks.  Prescription drug management - meds as above    Follow-up in the office in 3 months. Thank you for allowing me to share in the care of this patient.  López Farr M.D.